# Patient Record
Sex: FEMALE | Race: WHITE | NOT HISPANIC OR LATINO | Employment: OTHER | URBAN - METROPOLITAN AREA
[De-identification: names, ages, dates, MRNs, and addresses within clinical notes are randomized per-mention and may not be internally consistent; named-entity substitution may affect disease eponyms.]

---

## 2023-08-09 LAB
ALANINE AMINOTRANSFERASE (SGPT) (U/L) IN SER/PLAS: 16 U/L (ref 7–45)
ALBUMIN (G/DL) IN SER/PLAS: 4 G/DL (ref 3.4–5)
ALKALINE PHOSPHATASE (U/L) IN SER/PLAS: 62 U/L (ref 33–136)
ANION GAP IN SER/PLAS: 12 MMOL/L (ref 10–20)
ASPARTATE AMINOTRANSFERASE (SGOT) (U/L) IN SER/PLAS: 20 U/L (ref 9–39)
BILIRUBIN TOTAL (MG/DL) IN SER/PLAS: 0.5 MG/DL (ref 0–1.2)
C REACTIVE PROTEIN (MG/L) IN SER/PLAS: 0.1 MG/DL
CALCIUM (MG/DL) IN SER/PLAS: 9.4 MG/DL (ref 8.6–10.3)
CARBON DIOXIDE, TOTAL (MMOL/L) IN SER/PLAS: 27 MMOL/L (ref 21–32)
CHLORIDE (MMOL/L) IN SER/PLAS: 101 MMOL/L (ref 98–107)
CREATININE (MG/DL) IN SER/PLAS: 0.93 MG/DL (ref 0.5–1.05)
ERYTHROCYTE DISTRIBUTION WIDTH (RATIO) BY AUTOMATED COUNT: 13.3 % (ref 11.5–14.5)
ERYTHROCYTE MEAN CORPUSCULAR HEMOGLOBIN CONCENTRATION (G/DL) BY AUTOMATED: 32.6 G/DL (ref 32–36)
ERYTHROCYTE MEAN CORPUSCULAR VOLUME (FL) BY AUTOMATED COUNT: 96 FL (ref 80–100)
ERYTHROCYTES (10*6/UL) IN BLOOD BY AUTOMATED COUNT: 3.91 X10E12/L (ref 4–5.2)
GFR FEMALE: 67 ML/MIN/1.73M2
GLUCOSE (MG/DL) IN SER/PLAS: 86 MG/DL (ref 74–99)
HEMATOCRIT (%) IN BLOOD BY AUTOMATED COUNT: 37.4 % (ref 36–46)
HEMOGLOBIN (G/DL) IN BLOOD: 12.2 G/DL (ref 12–16)
LEUKOCYTES (10*3/UL) IN BLOOD BY AUTOMATED COUNT: 5.5 X10E9/L (ref 4.4–11.3)
PLATELET CLUMP (PRESENCE) IN BLOOD BY LIGHT MICROSCOPY: PRESENT
PLATELETS (10*3/UL) IN BLOOD AUTOMATED COUNT: 104 X10E9/L (ref 150–450)
POTASSIUM (MMOL/L) IN SER/PLAS: 4.3 MMOL/L (ref 3.5–5.3)
PROTEIN TOTAL: 7 G/DL (ref 6.4–8.2)
RBC MORPHOLOGY IN BLOOD: NORMAL
SEDIMENTATION RATE, ERYTHROCYTE: 15 MM/H (ref 0–30)
SODIUM (MMOL/L) IN SER/PLAS: 136 MMOL/L (ref 136–145)
UREA NITROGEN (MG/DL) IN SER/PLAS: 17 MG/DL (ref 6–23)

## 2023-11-21 PROBLEM — M45.9 ANKYLOSING SPONDYLITIS (MULTI): Status: ACTIVE | Noted: 2023-11-21

## 2023-11-21 PROBLEM — A15.9 TUBERCULOSIS, TREATED: Status: ACTIVE | Noted: 2023-11-21

## 2023-11-21 PROBLEM — M54.16 LUMBAR RADICULITIS: Status: ACTIVE | Noted: 2023-11-21

## 2023-11-21 PROBLEM — M25.562 KNEE PAIN, LEFT: Status: ACTIVE | Noted: 2023-11-21

## 2023-11-21 PROBLEM — R39.9 URINARY SYMPTOM OR SIGN: Status: ACTIVE | Noted: 2023-11-21

## 2023-11-21 PROBLEM — I10 BENIGN ESSENTIAL HYPERTENSION: Status: ACTIVE | Noted: 2023-11-21

## 2023-11-21 PROBLEM — J01.90 ACUTE SINUSITIS: Status: ACTIVE | Noted: 2023-11-21

## 2023-11-21 PROBLEM — R00.2 INTERMITTENT PALPITATIONS: Status: ACTIVE | Noted: 2023-11-21

## 2023-11-21 PROBLEM — R53.83 FATIGUE: Status: ACTIVE | Noted: 2023-11-21

## 2023-11-21 PROBLEM — Z85.828 HISTORY OF SKIN CANCER: Status: ACTIVE | Noted: 2023-11-21

## 2023-11-21 PROBLEM — E78.5 HYPERLIPIDEMIA: Status: ACTIVE | Noted: 2023-11-21

## 2023-11-21 PROBLEM — M54.2 CERVICAL PAIN: Status: ACTIVE | Noted: 2023-11-21

## 2023-11-21 PROBLEM — R60.9 DEPENDENT EDEMA: Status: ACTIVE | Noted: 2023-11-21

## 2023-11-21 PROBLEM — M96.1 LUMBAR POST-LAMINECTOMY SYNDROME: Status: ACTIVE | Noted: 2023-11-21

## 2023-11-21 PROBLEM — M54.16 LUMBAR RADICULOPATHY: Status: ACTIVE | Noted: 2023-11-21

## 2023-11-21 RX ORDER — ATENOLOL 25 MG/1
1 TABLET ORAL 2 TIMES DAILY
COMMUNITY
Start: 2019-01-16

## 2023-11-21 RX ORDER — BUTALB/ACETAMINOPHEN/CAFFEINE 50-325-40
TABLET ORAL
COMMUNITY
Start: 2021-02-02

## 2023-11-21 RX ORDER — LISINOPRIL 10 MG/1
1 TABLET ORAL DAILY
COMMUNITY
Start: 2021-02-02

## 2023-11-21 RX ORDER — GABAPENTIN 100 MG/1
100 CAPSULE ORAL 2 TIMES DAILY
COMMUNITY
Start: 2016-06-28 | End: 2023-11-27 | Stop reason: SDUPTHER

## 2023-11-21 RX ORDER — TRAMADOL HYDROCHLORIDE 50 MG/1
1 TABLET ORAL 4 TIMES DAILY PRN
COMMUNITY
Start: 2022-05-23 | End: 2023-11-27 | Stop reason: SDUPTHER

## 2023-11-21 RX ORDER — ATORVASTATIN CALCIUM 40 MG/1
1 TABLET, FILM COATED ORAL DAILY
COMMUNITY
Start: 2021-02-02

## 2023-11-21 RX ORDER — NALOXONE HYDROCHLORIDE 4 MG/.1ML
SPRAY NASAL
COMMUNITY
Start: 2022-05-16 | End: 2023-11-27 | Stop reason: SDUPTHER

## 2023-11-27 ENCOUNTER — OFFICE VISIT (OUTPATIENT)
Dept: PAIN MEDICINE | Facility: CLINIC | Age: 68
End: 2023-11-27
Payer: MEDICARE

## 2023-11-27 VITALS — RESPIRATION RATE: 16 BRPM | SYSTOLIC BLOOD PRESSURE: 127 MMHG | HEART RATE: 64 BPM | DIASTOLIC BLOOD PRESSURE: 72 MMHG

## 2023-11-27 DIAGNOSIS — M96.1 LUMBAR POST-LAMINECTOMY SYNDROME: ICD-10-CM

## 2023-11-27 DIAGNOSIS — F11.90 CHRONIC, CONTINUOUS USE OF OPIOIDS: ICD-10-CM

## 2023-11-27 DIAGNOSIS — M54.16 LUMBAR RADICULITIS: ICD-10-CM

## 2023-11-27 DIAGNOSIS — M54.16 LUMBAR RADICULOPATHY: ICD-10-CM

## 2023-11-27 PROCEDURE — 1159F MED LIST DOCD IN RCRD: CPT | Performed by: ANESTHESIOLOGY

## 2023-11-27 PROCEDURE — 3078F DIAST BP <80 MM HG: CPT | Performed by: ANESTHESIOLOGY

## 2023-11-27 PROCEDURE — 1125F AMNT PAIN NOTED PAIN PRSNT: CPT | Performed by: ANESTHESIOLOGY

## 2023-11-27 PROCEDURE — 99203 OFFICE O/P NEW LOW 30 MIN: CPT | Performed by: ANESTHESIOLOGY

## 2023-11-27 PROCEDURE — 3074F SYST BP LT 130 MM HG: CPT | Performed by: ANESTHESIOLOGY

## 2023-11-27 PROCEDURE — 99213 OFFICE O/P EST LOW 20 MIN: CPT | Performed by: ANESTHESIOLOGY

## 2023-11-27 PROCEDURE — 1036F TOBACCO NON-USER: CPT | Performed by: ANESTHESIOLOGY

## 2023-11-27 RX ORDER — NALOXONE HYDROCHLORIDE 4 MG/.1ML
4 SPRAY NASAL AS NEEDED
Qty: 2 EACH | Refills: 0 | Status: SHIPPED | OUTPATIENT
Start: 2023-11-27

## 2023-11-27 RX ORDER — TRAMADOL HYDROCHLORIDE 50 MG/1
50 TABLET ORAL 4 TIMES DAILY PRN
Qty: 112 TABLET | Refills: 5 | Status: SHIPPED | OUTPATIENT
Start: 2023-11-27 | End: 2024-05-16 | Stop reason: SDUPTHER

## 2023-11-27 RX ORDER — GABAPENTIN 100 MG/1
100 CAPSULE ORAL 2 TIMES DAILY
Qty: 60 CAPSULE | Refills: 11 | Status: SHIPPED | OUTPATIENT
Start: 2023-11-27

## 2023-11-27 ASSESSMENT — ENCOUNTER SYMPTOMS
GASTROINTESTINAL NEGATIVE: 1
BACK PAIN: 1
CONSTITUTIONAL NEGATIVE: 1
DEPRESSION: 0
NUMBNESS: 1
CARDIOVASCULAR NEGATIVE: 1
LOSS OF SENSATION IN FEET: 0
OCCASIONAL FEELINGS OF UNSTEADINESS: 0
RESPIRATORY NEGATIVE: 1

## 2023-11-27 ASSESSMENT — PAIN SCALES - GENERAL
PAINLEVEL_OUTOF10: 4
PAINLEVEL: 4

## 2023-11-27 ASSESSMENT — PAIN DESCRIPTION - DESCRIPTORS: DESCRIPTORS: ACHING;SHARP

## 2023-11-27 ASSESSMENT — PAIN - FUNCTIONAL ASSESSMENT: PAIN_FUNCTIONAL_ASSESSMENT: 0-10
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
## 2024-01-17 DIAGNOSIS — M45.0 ANKYLOSING SPONDYLITIS OF MULTIPLE SITES IN SPINE (MULTI): Primary | ICD-10-CM

## 2024-01-17 RX ORDER — SECUKINUMAB 150 MG/ML
150 INJECTION SUBCUTANEOUS
Qty: 1 ML | Refills: 5 | Status: SHIPPED | OUTPATIENT
Start: 2024-01-17 | End: 2024-07-15

## 2024-02-06 ENCOUNTER — LAB (OUTPATIENT)
Dept: LAB | Facility: LAB | Age: 69
End: 2024-02-06
Payer: MEDICARE

## 2024-02-06 ENCOUNTER — OFFICE VISIT (OUTPATIENT)
Dept: RHEUMATOLOGY | Facility: CLINIC | Age: 69
End: 2024-02-06
Payer: MEDICARE

## 2024-02-06 VITALS
HEIGHT: 61 IN | HEART RATE: 49 BPM | BODY MASS INDEX: 25.11 KG/M2 | WEIGHT: 133 LBS | DIASTOLIC BLOOD PRESSURE: 72 MMHG | SYSTOLIC BLOOD PRESSURE: 130 MMHG

## 2024-02-06 DIAGNOSIS — R53.83 OTHER FATIGUE: Primary | ICD-10-CM

## 2024-02-06 DIAGNOSIS — E55.9 HYPOVITAMINOSIS D: ICD-10-CM

## 2024-02-06 DIAGNOSIS — Z79.899 ENCOUNTER FOR LONG-TERM (CURRENT) USE OF HIGH-RISK MEDICATION: ICD-10-CM

## 2024-02-06 DIAGNOSIS — M45.0 ANKYLOSING SPONDYLITIS OF MULTIPLE SITES IN SPINE (MULTI): ICD-10-CM

## 2024-02-06 DIAGNOSIS — R53.83 OTHER FATIGUE: ICD-10-CM

## 2024-02-06 LAB
ALBUMIN SERPL BCP-MCNC: 4.2 G/DL (ref 3.4–5)
ALP SERPL-CCNC: 65 U/L (ref 33–136)
ALT SERPL W P-5'-P-CCNC: 16 U/L (ref 7–45)
ANION GAP SERPL CALC-SCNC: 11 MMOL/L (ref 10–20)
AST SERPL W P-5'-P-CCNC: 21 U/L (ref 9–39)
BILIRUB SERPL-MCNC: 0.3 MG/DL (ref 0–1.2)
BUN SERPL-MCNC: 15 MG/DL (ref 6–23)
CALCIUM SERPL-MCNC: 9.6 MG/DL (ref 8.6–10.3)
CHLORIDE SERPL-SCNC: 101 MMOL/L (ref 98–107)
CO2 SERPL-SCNC: 28 MMOL/L (ref 21–32)
CREAT SERPL-MCNC: 0.93 MG/DL (ref 0.5–1.05)
CRP SERPL-MCNC: 0.21 MG/DL
EGFRCR SERPLBLD CKD-EPI 2021: 67 ML/MIN/1.73M*2
ERYTHROCYTE [DISTWIDTH] IN BLOOD BY AUTOMATED COUNT: 13.1 % (ref 11.5–14.5)
ERYTHROCYTE [SEDIMENTATION RATE] IN BLOOD BY WESTERGREN METHOD: 32 MM/H (ref 0–30)
GLUCOSE SERPL-MCNC: 83 MG/DL (ref 74–99)
HCT VFR BLD AUTO: 40.7 % (ref 36–46)
HGB BLD-MCNC: 13.1 G/DL (ref 12–16)
MCH RBC QN AUTO: 30.8 PG (ref 26–34)
MCHC RBC AUTO-ENTMCNC: 32.2 G/DL (ref 32–36)
MCV RBC AUTO: 96 FL (ref 80–100)
NRBC BLD-RTO: 0 /100 WBCS (ref 0–0)
PLATELET # BLD AUTO: 230 X10*3/UL (ref 150–450)
POTASSIUM SERPL-SCNC: 4 MMOL/L (ref 3.5–5.3)
PROT SERPL-MCNC: 7.7 G/DL (ref 6.4–8.2)
RBC # BLD AUTO: 4.26 X10*6/UL (ref 4–5.2)
SODIUM SERPL-SCNC: 136 MMOL/L (ref 136–145)
TSH SERPL-ACNC: 2.29 MIU/L (ref 0.44–3.98)
WBC # BLD AUTO: 7.1 X10*3/UL (ref 4.4–11.3)

## 2024-02-06 PROCEDURE — 86140 C-REACTIVE PROTEIN: CPT

## 2024-02-06 PROCEDURE — 80053 COMPREHEN METABOLIC PANEL: CPT

## 2024-02-06 PROCEDURE — 3075F SYST BP GE 130 - 139MM HG: CPT | Performed by: INTERNAL MEDICINE

## 2024-02-06 PROCEDURE — 1036F TOBACCO NON-USER: CPT | Performed by: INTERNAL MEDICINE

## 2024-02-06 PROCEDURE — 82607 VITAMIN B-12: CPT

## 2024-02-06 PROCEDURE — 82306 VITAMIN D 25 HYDROXY: CPT

## 2024-02-06 PROCEDURE — 85652 RBC SED RATE AUTOMATED: CPT

## 2024-02-06 PROCEDURE — 1125F AMNT PAIN NOTED PAIN PRSNT: CPT | Performed by: INTERNAL MEDICINE

## 2024-02-06 PROCEDURE — 3078F DIAST BP <80 MM HG: CPT | Performed by: INTERNAL MEDICINE

## 2024-02-06 PROCEDURE — 99214 OFFICE O/P EST MOD 30 MIN: CPT | Performed by: INTERNAL MEDICINE

## 2024-02-06 PROCEDURE — 86481 TB AG RESPONSE T-CELL SUSP: CPT

## 2024-02-06 PROCEDURE — 84443 ASSAY THYROID STIM HORMONE: CPT

## 2024-02-06 PROCEDURE — 1159F MED LIST DOCD IN RCRD: CPT | Performed by: INTERNAL MEDICINE

## 2024-02-06 PROCEDURE — 85027 COMPLETE CBC AUTOMATED: CPT

## 2024-02-06 PROCEDURE — 36415 COLL VENOUS BLD VENIPUNCTURE: CPT

## 2024-02-06 ASSESSMENT — ENCOUNTER SYMPTOMS
SHORTNESS OF BREATH: 0
FATIGUE: 1

## 2024-02-06 NOTE — PROGRESS NOTES
Subjective   Patient ID: Mariah Hurd is a 68 y.o. female who presents for Follow-up.  HPI  Patient with history of ankylosing spondylitis with history of fusion of the lumbar spine.  Previously has been on Enbrel, Humira, Cimzia.  Status post tuberculosis treatment.    At her last visit on 8/8/2023 we had her continue with Cosentyx.    She did have a fall when she was walking into the grocery store.  She usually walks in with a cane.  Someone had to pick her up.  She had lost her balance.  She had just finished up physical therapy and her balance has improved especially when she is in the bathroom.  She can close her eyes and not fall over.  She has had some decrease in her spine pain.    In the last 6 months though she has been having more pain in her upper back.    She still continues to be with pain management and is on tramadol and gabapentin.    She feels very fatigued.  Her endurance is decreased.  She is part of a singing group and has no issues singing.  She has been sleeping about 50% of the time.  When the weather is bad she does not want to go out because she is afraid she might fall.  Review of Systems   Constitutional:  Positive for fatigue.   Eyes:         No inflammatory eye issues   Respiratory:  Negative for shortness of breath.    Cardiovascular:  Negative for chest pain.   Gastrointestinal:         Colonoscopy last year saw tubular adenoma   Musculoskeletal:         Per HPI   Skin:  Negative for rash.       Objective   Physical Exam  GEN: NAD A&O x3 appropriate affect limping cane.   LYMPH: no cervical lymphadenopathy  SKIN: no rashes  PULSES: +radials  TENDER POINTS: 04/18   MSK:  hypertrophic changes in the dip pip  No tenderness on palpation  No tenderness in the cervical spine, tender lower spine  Right knee replacement      Assessment/Plan     ankylosing spondylitis s/p fusion lumbar spine.  Previously has been on Enbrel, Humira, Cimzia.  Previous tuberculosis treatment.    -Will have  her continue with Cosentyx   -She did have a fall since we last saw her and has just completed physical therapy.  Discussed about using a rollator instead of a cane     Increasing fatigue.  She says she has been sleeping 50% of the time.  She does not want to leave the house especially when it is cold.  She is afraid to go alone because she might fall like she did in the parking lot.  She claims that her mood has been doing okay.  Will do blood work to see for other reasons of fatigue.    Previously has been on enbrel and Humira. History of tuberculosis treatment. Patient felt Cimzia contributed to her depression. Currently on Cosentyx. Her current regimen. Will have her do blood work today. Discussed I am not concerned with her being on with her history of tubular adenoma     Last bone density from June 19, 2023 ordered by another provider showed osteopenia    Will see her again in 6 months or as needed       Kristy Lamb MD 02/06/24 11:54 AM

## 2024-02-07 LAB
25(OH)D3 SERPL-MCNC: 42 NG/ML (ref 30–100)
VIT B12 SERPL-MCNC: 554 PG/ML (ref 211–911)

## 2024-02-08 LAB
NIL(NEG) CONTROL SPOT COUNT: NORMAL
PANEL A SPOT COUNT: 0
PANEL B SPOT COUNT: 0
POS CONTROL SPOT COUNT: NORMAL
T-SPOT. TB INTERPRETATION: NEGATIVE

## 2024-04-02 ENCOUNTER — TELEPHONE (OUTPATIENT)
Dept: OBSTETRICS AND GYNECOLOGY | Facility: CLINIC | Age: 69
End: 2024-04-02
Payer: MEDICARE

## 2024-04-02 DIAGNOSIS — Z12.31 VISIT FOR SCREENING MAMMOGRAM: Primary | ICD-10-CM

## 2024-04-08 ENCOUNTER — APPOINTMENT (OUTPATIENT)
Dept: RADIOLOGY | Facility: HOSPITAL | Age: 69
End: 2024-04-08
Payer: MEDICARE

## 2024-05-16 ENCOUNTER — OFFICE VISIT (OUTPATIENT)
Dept: PAIN MEDICINE | Facility: CLINIC | Age: 69
End: 2024-05-16
Payer: MEDICARE

## 2024-05-16 VITALS
HEART RATE: 73 BPM | DIASTOLIC BLOOD PRESSURE: 71 MMHG | RESPIRATION RATE: 16 BRPM | OXYGEN SATURATION: 100 % | SYSTOLIC BLOOD PRESSURE: 145 MMHG

## 2024-05-16 DIAGNOSIS — M45.0 ANKYLOSING SPONDYLITIS OF MULTIPLE SITES IN SPINE (MULTI): ICD-10-CM

## 2024-05-16 DIAGNOSIS — M96.1 LUMBAR POST-LAMINECTOMY SYNDROME: Primary | ICD-10-CM

## 2024-05-16 DIAGNOSIS — M54.16 LUMBAR RADICULITIS: ICD-10-CM

## 2024-05-16 PROCEDURE — 99213 OFFICE O/P EST LOW 20 MIN: CPT | Performed by: NURSE PRACTITIONER

## 2024-05-16 PROCEDURE — 1160F RVW MEDS BY RX/DR IN RCRD: CPT | Performed by: NURSE PRACTITIONER

## 2024-05-16 PROCEDURE — 1036F TOBACCO NON-USER: CPT | Performed by: NURSE PRACTITIONER

## 2024-05-16 PROCEDURE — 3077F SYST BP >= 140 MM HG: CPT | Performed by: NURSE PRACTITIONER

## 2024-05-16 PROCEDURE — 3078F DIAST BP <80 MM HG: CPT | Performed by: NURSE PRACTITIONER

## 2024-05-16 PROCEDURE — 1125F AMNT PAIN NOTED PAIN PRSNT: CPT | Performed by: NURSE PRACTITIONER

## 2024-05-16 PROCEDURE — 1159F MED LIST DOCD IN RCRD: CPT | Performed by: NURSE PRACTITIONER

## 2024-05-16 RX ORDER — TRAMADOL HYDROCHLORIDE 50 MG/1
50 TABLET ORAL 4 TIMES DAILY PRN
Qty: 112 TABLET | Refills: 2 | Status: SHIPPED | OUTPATIENT
Start: 2024-05-16

## 2024-05-16 ASSESSMENT — ENCOUNTER SYMPTOMS
CARDIOVASCULAR NEGATIVE: 1
PSYCHIATRIC NEGATIVE: 1
EYES NEGATIVE: 1
JOINT SWELLING: 0
MYALGIAS: 1
NECK PAIN: 0
GASTROINTESTINAL NEGATIVE: 1
NUMBNESS: 1
HEADACHES: 0
ENDOCRINE NEGATIVE: 1
WEAKNESS: 1
ARTHRALGIAS: 1
SEIZURES: 0
DIZZINESS: 0
NECK STIFFNESS: 0
FACIAL ASYMMETRY: 0
RESPIRATORY NEGATIVE: 1
SPEECH DIFFICULTY: 0
LIGHT-HEADEDNESS: 0
BACK PAIN: 1
TREMORS: 0
ALLERGIC/IMMUNOLOGIC NEGATIVE: 1
HEMATOLOGIC/LYMPHATIC NEGATIVE: 1
CONSTITUTIONAL NEGATIVE: 1

## 2024-05-16 ASSESSMENT — PAIN SCALES - GENERAL: PAINLEVEL: 4

## 2024-05-16 NOTE — PROGRESS NOTES
Subjective   Patient ID: Mariah Hurd is a 68 y.o. female who presents for Med Refill and Pain (Low Back, Legs ).  TIAN Love follows up for interval reevaluation of her chronic back pain from lumbar post laminectomy syndrome, left leg radiculitis.      Tramadol 50 mg 4 to 5 times daily and gabapentin 100 mg 2 capsules at night help to reduce pain and improve function between 70 and 80%. On a good day average pain score is 3 out of 10.  Denies negative side effects of medications.    Continues to manage ADLs  independently with improved function with the use of medication. Has difficulty with weightbearing activities as leg pain and weakness are both in a L4 and L5 dermatomal distribution to the anterior thigh and knee.      Has a below average quality family and social of current condition and treatment. Has not been to the ED for pain since last office visit. She does continue to go to chiropractor and daily exercise with physical therapy home exercises to help with back and leg pain and right leg weakness.      Toxicology consistent November 27, 2023.  Annual controlled substance agreement and opioid risk tool are completed and scanned into the chart May 16, 2024.    Noted on pharmacy report reviewed today May 16, 2024 gabapentin for her dog.     Her low back pain is chronic pain that is stiff and achy in nature can also be sharp and stabbing greater on right than the left. Her low back pain is from lumbar post laminectomy syndrome and ankylosing spondylitis. She is also chronic right lower extremity weakness which has not changed. She does utilize a cane for ambulation for this weak leg. No falls since last office visit.     Bilateral transforaminal L4 and L5 lumbar epidural steroid injection November 4, 2021 only gave 10% relief of pain with improved function for up to 3 days. Unfortunately, steroid injection was not as helpful as we had hoped.     We will hold off on any further injection therapy. Can  pursue PT if she would like.  Last formal physical therapy was about 1 year ago.     Continues to have right leg pain after laying in bed 2 to 3 hours and then is up and walks about to help reduce the leg pain. Driving greater than 15-minute distances can also increase right leg pain.     For continuity:   Given the patient's report of reduced pain and improved functional ability without adverse effects, it is reasonable to treat with narcotic medications. The terms of the opioid agreement as well as the potential risks and adverse effects of the patient's medication regimen were discussed in detail. This includes if applicable due to dosage of medication permission to discuss and coordinate care with other treatment providers relevant to the patients condition. The patient verbalized understanding.     Risks and side effects of chronic opioid therapy including but not limited to tolerance, dependence, constipation, hyperalgesia, cognitive side effects, addiction and possible death due to overuse and or misuse were discussed. I also discussed that such medications when co-administered with other sedative agents including but not limited to alcohol, benzodiazepines, sedative hypnotics and illegal drugs could pose life threatening consequences including death. I also explained the impact that the administration of such medication has on a patient with obstructive sleep apnea and continued recommendations for use of apnea devices if ordered are prescribed by other physicians. In order to effectively and safely treat the pain, I also emphasized the importance of compliance with the treatment plan, as well as compliance with the terms of the opioid agreement, which was reviewed in detail. I explained the importance of being responsible with the medications and to take these only as prescribed, never in excess and never for reasons other than pain reduction. The patient was counseled on keeping the medications safe and  locked away from children and other adults as well as disposal methods and options. The patient understood the risks and instructions.     I also discussed with the patient in detail that based on the clinical response to the opioid medications and improvements of activities of daily living, sleep, and work performance in light of compliance with the treatment plan we can continue this form of therapy for the above chronic pain. The goal and rationale used for current treatment with chronic opioid medication is to control the pain and alleviate disability induced by the chronic pain condition noted above after failures of other non-opioid and nonpharmacological modalities to treat the chronic pain and the symptoms associated with have failed. The patient understood the goals in terms of the above treatment plan and had no further questions prior to leaving the office today.     Of note, the above-mentioned diagnoses/conditions and expected fluctuating nature of pain, and pain characteristic changes may lead to prolonged functional impairment requiring frequent and multiple reassessments with continued high level medical decision making. As noted, medication and medication management may require opiate therapy in excess of a routine less than 30 day medication requirement. The patient may require daily opiate therapy necessitating month-long prescription medication as noted above in order to perform activities of daily living and achieve acceptable quality of life with respect to their chronic pain condition for the foreseeable future. We monitor our patient's carefully through drug monitoring, medication counts, urine drug testing specific to their medication as well as a myriad of other substances and with frequent follow-ups with interval reassement of the chronic pain condition, its pathophysiology and prognosis.     The level of clinical decision making at this office visit is high due to high risks and  complications including mortality and morbidity related to acute and chronic pain with respects to life, bodily function, and treatment. Risks and clinical decisions with respect to under treatment, failure to maintain adequate treatment, and/or overtreatment complications and outcomes were discussed with the patient with respect to their chronic pain conditions, interventional therapies, as well as the use of various medications including possible controlled/dangerous medications. The amount and complexity of reviewed data at this in subsequent office visits is high given patient's fluctuating clinical presentation, laboratory and radiographic reports, prescription monitoring program data, and medication history as well as other relevant data as noted above. Pertinent negatives and positives data was used in consideration for the above-mentioned high complexity.      Given the patient's total MED, general use of daily opiates, or other coadministered medications in various classes the patient was offered a prescription for Narcan. I instructed the patient that it is important that patient fill this medication in order to demonstrate understanding of the gravity of possible side effects including respiratory depression and risk of overdose of this opiate load or medication combination. As such patient will be required to bring Narcan prescription to follow-up appointments as part of compliance with continued opiate care.     With respect to opiate induced constipation I discussed multiple ways to combat this problem including staying hydrated and taking over-the-counter medications such as Dulcolax, Miralax and Senna. If these treatments are not effective we could consider such medications as Amitiza, Linzess and Movantik.     Disclaimer: This note was transcribed using an audio transcription device. As such, minor errors may be present with regard to spelling, punctuation, and inadvertent word insertion. Please  disregard such errors.    Narrative & Impression   MRN: [id number]  Patient Name: MARIAN BURTON     STUDY:  SHOULDER, CMPLT, MIN 2 VIEWS;  11/4/2022 8:44 am     INDICATION:  right shoulder pain  M25.511: Right shoulder pain.     COMPARISON:  11/04/2022     ACCESSION NUMBER(S):  [id number]     ORDERING CLINICIAN:  PEDRO MCMAHON     FINDINGS:  Four views right shoulder.     Mild to moderate right glenohumeral osteoarthrosis and mild  acromioclavicular osteoarthrosis. No fracture or dislocation. No  osseous lesion. Soft tissues intact     IMPRESSION:  Mild to moderate right glenohumeral and acromioclavicular  osteoarthrosis. Fracture or dislocation.        Narrative & Impression   MRN: [id number]  Patient Name: MARIAN BURTON     STUDY:  MR L-SPINE WO/W CONTRAST; ;  10/6/2021 10:32 am     INDICATION:  lumbar region.     COMPARISON:  12/27/2018     ACCESSION NUMBER(S):  [id number]     ORDERING CLINICIAN:  CIPRIANO ALICEA     TECHNIQUE:  Multiplanar, multisequence imaging of the lumbar spine was performed  before and after intravenous contrast administration. 12 mL Dotarem  was injected.     FINDINGS:  Conus medullaris terminates at the level of L1 vertebral body.  Visualized portions of the conus and spinal cord shows normal signal  intensity and caliber. Grade 1 anterolisthesis of L5 on S1, measures  up to 8.9 mm, appears slightly decreased from previous study, was  previously measuring up to 10 mm. 2.9 mm retrolisthesis of T12 on L1  shows minimal interval change from prior.     Again note is made of the laminectomy changes at L5-S1 with a  intervening disc grafts at L4-L5, L5-S1 and a left-sided  transpedicular screws at L4, L5 and S1. Again note is made of the  postsurgical changes with laminectomy/posterior clips at L2, causing  streak artifact and limiting evaluation.     On contrast administration there is no pathological enhancement.  Multiple areas of fatty rests in the thoracic and lumbar, sacral  vertebral bodies are  again noted. Mild chronic decrease in the height  of T11 vertebral body/superior endplate is again noted.     At T11-T12 right paracentral disc bulge noted causing indentation of  the anterior thecal sac, minimally progressed from prior. No mass  effect on the cord.     At T12-L1 mild disc osteophyte noted indenting the anterior thecal  sac, mild facet joint arthropathy indenting the posterolateral aspect  of the thecal sac. No significant central spinal canal or neural  foramina stenosis.     At L1-L2 susceptibility artifact from the metallic clips of the L2  vertebral body limit evaluation, no obvious disc bulge, spinal canal  or neural foramina stenosis noted.     At L2-L3 mild facet joint arthropathy noted, postsurgical changes  with a metallic clip on the posterior elements cause susceptibility  artifact and limits evaluation, no obvious central spinal canal or  neural foramina stenosis noted.     At L3-L4 mild facet joint arthropathy noted. No central spinal canal  or neural foramina stenosis.     At L4-L5 mild disc osteophyte noted, moderate facet joint arthropathy  in combination noted causing mild encroachment of the spinal canal  and neural foramina, shows no significant change from prior.     At L5-S1 grade 1 anterolisthesis of L5 on S1, postsurgical changes as  described above, no central spinal canal or right neural foramina  narrowing, mild left neural foramina narrowing, shows minimal  interval change from prior.     IMPRESSION:  Degenerative and postoperative changes as described above, no  significant central spinal canal or neural foramina stenosis. No  significant change from prior.          Review of Systems   Constitutional: Negative.    HENT: Negative.     Eyes: Negative.    Respiratory: Negative.     Cardiovascular: Negative.    Gastrointestinal: Negative.    Endocrine: Negative.    Genitourinary: Negative.    Musculoskeletal:  Positive for arthralgias, back pain, gait problem and myalgias.  Negative for joint swelling, neck pain and neck stiffness.   Skin: Negative.    Allergic/Immunologic: Negative.    Neurological:  Positive for weakness and numbness. Negative for dizziness, tremors, seizures, syncope, facial asymmetry, speech difficulty, light-headedness and headaches.   Hematological: Negative.    Psychiatric/Behavioral: Negative.         Objective   Physical Exam  Vitals and nursing note reviewed.   Constitutional:       Appearance: Normal appearance.   Eyes:      Conjunctiva/sclera: Conjunctivae normal.   Cardiovascular:      Pulses: Normal pulses.   Pulmonary:      Effort: Pulmonary effort is normal. No respiratory distress.   Musculoskeletal:      Right lower leg: No edema.      Left lower leg: No edema.      Comments: Posture stooped.  Ambulates with right-sided cane and antalgic gait.   Skin:     General: Skin is warm and dry.      Capillary Refill: Capillary refill takes 2 to 3 seconds.   Neurological:      Mental Status: She is alert and oriented to person, place, and time.      Cranial Nerves: No cranial nerve deficit.      Sensory: Sensory deficit present.      Motor: Weakness present.      Gait: Gait abnormal.      Comments: Right lower extremity with resisted hip flexion, knee extension and ankle dorsiflexion 3 out of 5 compared to left.  Allodynia to light touch to right and left lower extremities in L4 and L5 dermatomal distribution.  Negative straight leg raise.   Psychiatric:         Mood and Affect: Mood normal.         Behavior: Behavior normal.         Assessment/Plan   Problem List Items Addressed This Visit             ICD-10-CM    Lumbar radiculitis M54.16    Relevant Medications    traMADol (Ultram) 50 mg tablet    Lumbar post-laminectomy syndrome - Primary M96.1    Relevant Medications    traMADol (Ultram) 50 mg tablet    Ankylosing spondylitis (Multi) M45.9        Follow-up 6 months.    EMMANUEL Aleman-CNP 05/16/24 9:00 AM

## 2024-05-17 ENCOUNTER — APPOINTMENT (OUTPATIENT)
Dept: PAIN MEDICINE | Facility: CLINIC | Age: 69
End: 2024-05-17
Payer: MEDICARE

## 2024-05-28 ENCOUNTER — APPOINTMENT (OUTPATIENT)
Dept: PAIN MEDICINE | Facility: CLINIC | Age: 69
End: 2024-05-28
Payer: MEDICARE

## 2024-06-21 ENCOUNTER — HOSPITAL ENCOUNTER (OUTPATIENT)
Dept: RADIOLOGY | Facility: HOSPITAL | Age: 69
Discharge: HOME | End: 2024-06-21
Payer: MEDICARE

## 2024-06-21 VITALS — HEIGHT: 62 IN | WEIGHT: 132 LBS | BODY MASS INDEX: 24.29 KG/M2

## 2024-06-21 DIAGNOSIS — Z12.31 VISIT FOR SCREENING MAMMOGRAM: ICD-10-CM

## 2024-06-21 PROCEDURE — 77067 SCR MAMMO BI INCL CAD: CPT

## 2024-07-11 DIAGNOSIS — M96.1 LUMBAR POST-LAMINECTOMY SYNDROME: ICD-10-CM

## 2024-07-11 DIAGNOSIS — M54.16 LUMBAR RADICULITIS: ICD-10-CM

## 2024-07-11 RX ORDER — TRAMADOL HYDROCHLORIDE 50 MG/1
50 TABLET ORAL 4 TIMES DAILY PRN
Qty: 112 TABLET | Refills: 2 | Status: SHIPPED | OUTPATIENT
Start: 2024-07-11

## 2024-08-06 ENCOUNTER — LAB (OUTPATIENT)
Dept: LAB | Facility: LAB | Age: 69
End: 2024-08-06
Payer: MEDICARE

## 2024-08-06 ENCOUNTER — APPOINTMENT (OUTPATIENT)
Dept: RHEUMATOLOGY | Facility: CLINIC | Age: 69
End: 2024-08-06
Payer: MEDICARE

## 2024-08-06 VITALS — SYSTOLIC BLOOD PRESSURE: 118 MMHG | DIASTOLIC BLOOD PRESSURE: 68 MMHG | OXYGEN SATURATION: 100 % | HEART RATE: 71 BPM

## 2024-08-06 DIAGNOSIS — M45.0 ANKYLOSING SPONDYLITIS OF MULTIPLE SITES IN SPINE (MULTI): Primary | ICD-10-CM

## 2024-08-06 DIAGNOSIS — Z79.899 ENCOUNTER FOR LONG-TERM (CURRENT) USE OF HIGH-RISK MEDICATION: ICD-10-CM

## 2024-08-06 DIAGNOSIS — M45.0 ANKYLOSING SPONDYLITIS OF MULTIPLE SITES IN SPINE (MULTI): ICD-10-CM

## 2024-08-06 LAB
ALBUMIN SERPL BCP-MCNC: 4.1 G/DL (ref 3.4–5)
ALP SERPL-CCNC: 62 U/L (ref 33–136)
ALT SERPL W P-5'-P-CCNC: 17 U/L (ref 7–45)
ANION GAP SERPL CALC-SCNC: 14 MMOL/L (ref 10–20)
AST SERPL W P-5'-P-CCNC: 22 U/L (ref 9–39)
BILIRUB SERPL-MCNC: 0.5 MG/DL (ref 0–1.2)
BUN SERPL-MCNC: 21 MG/DL (ref 6–23)
CALCIUM SERPL-MCNC: 9.7 MG/DL (ref 8.6–10.3)
CHLORIDE SERPL-SCNC: 100 MMOL/L (ref 98–107)
CO2 SERPL-SCNC: 26 MMOL/L (ref 21–32)
CREAT SERPL-MCNC: 0.91 MG/DL (ref 0.5–1.05)
CRP SERPL-MCNC: <0.1 MG/DL
EGFRCR SERPLBLD CKD-EPI 2021: 68 ML/MIN/1.73M*2
ERYTHROCYTE [DISTWIDTH] IN BLOOD BY AUTOMATED COUNT: 13.1 % (ref 11.5–14.5)
ERYTHROCYTE [SEDIMENTATION RATE] IN BLOOD BY WESTERGREN METHOD: 24 MM/H (ref 0–30)
GLUCOSE SERPL-MCNC: 86 MG/DL (ref 74–99)
HCT VFR BLD AUTO: 39.2 % (ref 36–46)
HGB BLD-MCNC: 12.6 G/DL (ref 12–16)
MCH RBC QN AUTO: 31 PG (ref 26–34)
MCHC RBC AUTO-ENTMCNC: 32.1 G/DL (ref 32–36)
MCV RBC AUTO: 97 FL (ref 80–100)
NRBC BLD-RTO: 0 /100 WBCS (ref 0–0)
PLATELET # BLD AUTO: 132 X10*3/UL (ref 150–450)
POTASSIUM SERPL-SCNC: 4.2 MMOL/L (ref 3.5–5.3)
PROT SERPL-MCNC: 7 G/DL (ref 6.4–8.2)
RBC # BLD AUTO: 4.06 X10*6/UL (ref 4–5.2)
SODIUM SERPL-SCNC: 136 MMOL/L (ref 136–145)
WBC # BLD AUTO: 6.5 X10*3/UL (ref 4.4–11.3)

## 2024-08-06 PROCEDURE — 1036F TOBACCO NON-USER: CPT | Performed by: INTERNAL MEDICINE

## 2024-08-06 PROCEDURE — 99213 OFFICE O/P EST LOW 20 MIN: CPT | Performed by: INTERNAL MEDICINE

## 2024-08-06 PROCEDURE — 85027 COMPLETE CBC AUTOMATED: CPT

## 2024-08-06 PROCEDURE — 85652 RBC SED RATE AUTOMATED: CPT

## 2024-08-06 PROCEDURE — 86140 C-REACTIVE PROTEIN: CPT

## 2024-08-06 PROCEDURE — 1159F MED LIST DOCD IN RCRD: CPT | Performed by: INTERNAL MEDICINE

## 2024-08-06 PROCEDURE — 36415 COLL VENOUS BLD VENIPUNCTURE: CPT

## 2024-08-06 PROCEDURE — 1125F AMNT PAIN NOTED PAIN PRSNT: CPT | Performed by: INTERNAL MEDICINE

## 2024-08-06 PROCEDURE — 3078F DIAST BP <80 MM HG: CPT | Performed by: INTERNAL MEDICINE

## 2024-08-06 PROCEDURE — 3074F SYST BP LT 130 MM HG: CPT | Performed by: INTERNAL MEDICINE

## 2024-08-06 PROCEDURE — 80053 COMPREHEN METABOLIC PANEL: CPT

## 2024-08-06 RX ORDER — SECUKINUMAB 150 MG/ML
150 INJECTION SUBCUTANEOUS ONCE
COMMUNITY
End: 2024-08-06 | Stop reason: SDUPTHER

## 2024-08-06 RX ORDER — SECUKINUMAB 150 MG/ML
150 INJECTION SUBCUTANEOUS
Qty: 3 ML | Refills: 3 | Status: SHIPPED | OUTPATIENT
Start: 2024-08-06 | End: 2025-08-06

## 2024-08-06 ASSESSMENT — PATIENT HEALTH QUESTIONNAIRE - PHQ9
1. LITTLE INTEREST OR PLEASURE IN DOING THINGS: NOT AT ALL
2. FEELING DOWN, DEPRESSED OR HOPELESS: NOT AT ALL
SUM OF ALL RESPONSES TO PHQ9 QUESTIONS 1 & 2: 0

## 2024-08-06 ASSESSMENT — ENCOUNTER SYMPTOMS
FATIGUE: 1
DEPRESSION: 0
LOSS OF SENSATION IN FEET: 0
OCCASIONAL FEELINGS OF UNSTEADINESS: 1
SHORTNESS OF BREATH: 0

## 2024-08-06 ASSESSMENT — LIFESTYLE VARIABLES
SKIP TO QUESTIONS 9-10: 1
HOW OFTEN DO YOU HAVE A DRINK CONTAINING ALCOHOL: MONTHLY OR LESS
HOW MANY STANDARD DRINKS CONTAINING ALCOHOL DO YOU HAVE ON A TYPICAL DAY: 1 OR 2
AUDIT-C TOTAL SCORE: 1
HOW OFTEN DO YOU HAVE SIX OR MORE DRINKS ON ONE OCCASION: NEVER

## 2024-08-06 ASSESSMENT — PAIN SCALES - GENERAL: PAINLEVEL: 4

## 2024-08-06 NOTE — TELEPHONE ENCOUNTER
Patient called in requesting a refill on her Cosentyx injection be sent to [facility].     Rx pending to be sent into pharmacy upon your approval.     Please assist and advise.  Thank you   Medina

## 2024-08-06 NOTE — PROGRESS NOTES
Subjective   Patient ID: Mariah Hurd is a 69 y.o. female who presents for Follow-up.  HPI  Patient with history of ankylosing spondylitis with history of fusion of the lumbar spine.  Previously has been on Enbrel, Humira, Cimzia.  Status post tuberculosis treatment.    At her last visit on 8/8/2023 we had her continue with Cosentyx.    She did have a fall when she was walking into the grocery store.  She usually walks in with a cane.  Someone had to pick her up.  She had lost her balance.  She had just finished up physical therapy and her balance has improved especially when she is in the bathroom.  She can close her eyes and not fall over.  She has had some decrease in her spine pain.    In the last 6 months though she has been having more pain in her upper back.    She still continues to be with pain management and is on tramadol and gabapentin.    She feels very fatigued.  Her endurance is decreased.  She is part of a singing group and has no issues singing.  She has been sleeping about 50% of the time.  When the weather is bad she does not want to go out because she is afraid she might fall.  Review of Systems   Constitutional:  Positive for fatigue.   Eyes:         No inflammatory eye issues   Respiratory:  Negative for shortness of breath.    Cardiovascular:  Negative for chest pain.   Gastrointestinal:         Colonoscopy last year saw tubular adenoma   Musculoskeletal:         Per HPI   Skin:  Negative for rash.       Objective   Physical Exam  GEN: NAD A&O x3 appropriate affect limping cane.   LYMPH: no cervical lymphadenopathy  SKIN: no rashes  PULSES: +radials  TENDER POINTS: 04/18   MSK:  hypertrophic changes in the dip pip  No tenderness on palpation  No tenderness in the cervical spine, tender lower spine  Right knee replacement      Assessment/Plan     ankylosing spondylitis s/p fusion lumbar spine.  Previously has been on Enbrel, Humira, Cimzia.  Previous tuberculosis treatment.    -Will have  her continue with Cosentyx   -She did have a fall since we last saw her and has just completed physical therapy.  Discussed about using a rollator instead of a cane     Increasing fatigue.  She says she has been sleeping 50% of the time.  She does not want to leave the house especially when it is cold.  She is afraid to go alone because she might fall like she did in the parking lot.  She claims that her mood has been doing okay.  Will do blood work to see for other reasons of fatigue.    Previously has been on enbrel and Humira. History of tuberculosis treatment. Patient felt Cimzia contributed to her depression. Currently on Cosentyx. Her current regimen. Will have her do blood work today. Discussed I am not concerned with her being on with her history of tubular adenoma     Last bone density from June 19, 2023 ordered by another provider showed osteopenia    Will see her again in 6 months or as needed       Dusty Dan DPM 08/06/24 9:03 AM

## 2024-08-06 NOTE — PROGRESS NOTES
Subjective   Patient ID: Mariah Hurd is a 69 y.o. female who presents for Follow-up.  HPI  Patient with history of ankylosing spondylitis with history of fusion of the lumbar spine.  Previously has been on Enbrel, Humira, Cimzia.  Status post tuberculosis treatment.    We had last seen her in February and had her continue with Cosentyx.    She has been working with a chiropractor for pain in her upper back.  He has been mostly stretching her so far and not doing a lot of manipulation.  Does have curvature there.  Sometimes the pain in her back can get very depressing.    She started using a walker and has helped her balance.  She cannot walk very far with a cane.    She has been walking at a park 1 and half miles and she feels good when she does it in does not with a walker.    She is currently on gabapentin and tramadol through pain management.    Denies any new falls since I last saw her.  Denies any infections.  Review of Systems   Constitutional:  Positive for fatigue.   Eyes:         No inflammatory eye issues   Respiratory:  Negative for shortness of breath.    Cardiovascular:  Negative for chest pain.   Musculoskeletal:         Per HPI   Skin:  Negative for rash.       Objective   Physical Exam  GEN: NAD A&O x3 appropriate affect limping cane.   LYMPH: no cervical lymphadenopathy  SKIN: no rashes  PULSES: +radials  TENDER POINTS: 04/18   MSK:  hypertrophic changes in the dip pip  No tenderness on palpation  No tenderness in the cervical spine, tender lower spine  Right knee replacement      Assessment/Plan     ankylosing spondylitis s/p fusion lumbar spine.  Previously has been on Enbrel, Humira, Cimzia.  Previous tuberculosis treatment.    -Will have her continue with Cosentyx   -will have her do blood work    Last bone density from June 19, 2023 ordered by another provider showed osteopenia    Will see her again in 6 months or as needed       Kristy Lamb MD 08/06/24 9:32 AM

## 2024-08-09 DIAGNOSIS — M45.0 ANKYLOSING SPONDYLITIS OF MULTIPLE SITES IN SPINE (MULTI): ICD-10-CM

## 2024-08-09 DIAGNOSIS — M96.1 LUMBAR POST-LAMINECTOMY SYNDROME: ICD-10-CM

## 2024-08-09 DIAGNOSIS — M54.16 LUMBAR RADICULITIS: ICD-10-CM

## 2024-08-09 RX ORDER — TRAMADOL HYDROCHLORIDE 50 MG/1
50 TABLET ORAL 4 TIMES DAILY PRN
Qty: 112 TABLET | Refills: 1 | Status: SHIPPED | OUTPATIENT
Start: 2024-08-09

## 2024-10-02 ENCOUNTER — OFFICE VISIT (OUTPATIENT)
Dept: PAIN MEDICINE | Facility: CLINIC | Age: 69
End: 2024-10-02
Payer: MEDICARE

## 2024-10-02 VITALS
OXYGEN SATURATION: 100 % | SYSTOLIC BLOOD PRESSURE: 123 MMHG | HEART RATE: 71 BPM | DIASTOLIC BLOOD PRESSURE: 70 MMHG | RESPIRATION RATE: 16 BRPM

## 2024-10-02 DIAGNOSIS — M96.1 LUMBAR POST-LAMINECTOMY SYNDROME: Primary | ICD-10-CM

## 2024-10-02 DIAGNOSIS — M54.16 LUMBAR RADICULITIS: ICD-10-CM

## 2024-10-02 DIAGNOSIS — Z79.891 ENCOUNTER FOR LONG-TERM USE OF OPIATE ANALGESIC: ICD-10-CM

## 2024-10-02 PROCEDURE — 1125F AMNT PAIN NOTED PAIN PRSNT: CPT | Performed by: NURSE PRACTITIONER

## 2024-10-02 PROCEDURE — 99214 OFFICE O/P EST MOD 30 MIN: CPT | Performed by: NURSE PRACTITIONER

## 2024-10-02 PROCEDURE — 1159F MED LIST DOCD IN RCRD: CPT | Performed by: NURSE PRACTITIONER

## 2024-10-02 PROCEDURE — 1036F TOBACCO NON-USER: CPT | Performed by: NURSE PRACTITIONER

## 2024-10-02 PROCEDURE — 3078F DIAST BP <80 MM HG: CPT | Performed by: NURSE PRACTITIONER

## 2024-10-02 PROCEDURE — 1160F RVW MEDS BY RX/DR IN RCRD: CPT | Performed by: NURSE PRACTITIONER

## 2024-10-02 PROCEDURE — 3074F SYST BP LT 130 MM HG: CPT | Performed by: NURSE PRACTITIONER

## 2024-10-02 RX ORDER — TRAMADOL HYDROCHLORIDE 50 MG/1
50 TABLET ORAL 4 TIMES DAILY PRN
Qty: 112 TABLET | Refills: 2 | Status: SHIPPED | OUTPATIENT
Start: 2024-10-03

## 2024-10-02 RX ORDER — NALOXONE HYDROCHLORIDE 4 MG/.1ML
4 SPRAY NASAL AS NEEDED
Qty: 2 EACH | Refills: 0 | Status: SHIPPED | OUTPATIENT
Start: 2024-10-02

## 2024-10-02 ASSESSMENT — ENCOUNTER SYMPTOMS
GASTROINTESTINAL NEGATIVE: 1
ENDOCRINE NEGATIVE: 1
SEIZURES: 0
FACIAL ASYMMETRY: 0
JOINT SWELLING: 0
SPEECH DIFFICULTY: 0
LIGHT-HEADEDNESS: 0
WEAKNESS: 1
EYES NEGATIVE: 1
MYALGIAS: 1
TREMORS: 0
NECK PAIN: 0
HEADACHES: 0
RESPIRATORY NEGATIVE: 1
ALLERGIC/IMMUNOLOGIC NEGATIVE: 1
NUMBNESS: 1
NECK STIFFNESS: 0
CARDIOVASCULAR NEGATIVE: 1
DIZZINESS: 0
HEMATOLOGIC/LYMPHATIC NEGATIVE: 1
ARTHRALGIAS: 1
PAIN: 1
BACK PAIN: 1
CONSTITUTIONAL NEGATIVE: 1
PSYCHIATRIC NEGATIVE: 1

## 2024-10-02 ASSESSMENT — PAIN SCALES - GENERAL: PAINLEVEL: 5

## 2024-10-02 NOTE — PROGRESS NOTES
Scheduled MIBG scan with patient and nuc med   Subjective   Patient ID: Mariah Hurd is a 69 y.o. female who presents for Med Refill and Pain (Low Back/Legs ).  Med Refill  Associated symptoms include arthralgias, myalgias, numbness and weakness. Pertinent negatives include no headaches, joint swelling or neck pain.   Pain  Associated symptoms include weakness. Pertinent negatives include no headaches or joint swelling.       Kate follows up for interval reevaluation of her chronic back pain from lumbar post laminectomy syndrome, left leg radiculitis.      Tramadol 50 mg 4 to 5 times daily and gabapentin 100 mg 2 capsules at night help to reduce pain and improve function between 70 and 80%. On a good day average pain score is 3 out of 10.  Denies negative side effects of medications.    Continues to manage ADLs  independently with improved function with the use of medication. Has difficulty with weightbearing activities as leg pain and weakness are both in a L4 and L5 dermatomal distribution to the anterior thigh and knee.      Has a below average quality family and social of current condition and treatment. Has not been to the ED for pain since last office visit. She does continue to go to chiropractor and daily exercise with physical therapy home exercises to help with back and leg pain and right leg weakness.      Toxicology consistent November 27, 2023.  Toxicology today.  To annual controlled substance agreement and opioid risk tool are completed and scanned into the chart May 16, 2024.    Noted on pharmacy report reviewed today May 16, 2024 gabapentin for her dog.     Her low back pain is chronic pain that is stiff and achy in nature can also be sharp and stabbing greater on right than the left. Her low back pain is from lumbar post laminectomy syndrome and ankylosing spondylitis. She is also chronic right lower extremity weakness which has not changed. She does utilize a cane for ambulation for this weak leg. No falls since last office visit.      History of bilateral transforaminal L4 and L5 lumbar epidural steroid injection November 4, 2021 only gave 10% relief of pain with improved function for up to 3 days. Unfortunately, steroid injection was not as helpful as we had hoped.     We will hold off on any further injection therapy. Can pursue PT if she would like.  Last formal physical therapy was about 2 years ago.     Continues to have right leg pain after laying in bed 2 to 3 hours and then is up and walks about to help reduce the leg pain. Driving greater than 15-minute distances can also increase right leg pain.     For continuity:   Given the patient's report of reduced pain and improved functional ability without adverse effects, it is reasonable to treat with narcotic medications. The terms of the opioid agreement as well as the potential risks and adverse effects of the patient's medication regimen were discussed in detail. This includes if applicable due to dosage of medication permission to discuss and coordinate care with other treatment providers relevant to the patients condition. The patient verbalized understanding.     Risks and side effects of chronic opioid therapy including but not limited to tolerance, dependence, constipation, hyperalgesia, cognitive side effects, addiction and possible death due to overuse and or misuse were discussed. I also discussed that such medications when co-administered with other sedative agents including but not limited to alcohol, benzodiazepines, sedative hypnotics and illegal drugs could pose life threatening consequences including death. I also explained the impact that the administration of such medication has on a patient with obstructive sleep apnea and continued recommendations for use of apnea devices if ordered are prescribed by other physicians. In order to effectively and safely treat the pain, I also emphasized the importance of compliance with the treatment plan, as well as compliance with the  terms of the opioid agreement, which was reviewed in detail. I explained the importance of being responsible with the medications and to take these only as prescribed, never in excess and never for reasons other than pain reduction. The patient was counseled on keeping the medications safe and locked away from children and other adults as well as disposal methods and options. The patient understood the risks and instructions.     I also discussed with the patient in detail that based on the clinical response to the opioid medications and improvements of activities of daily living, sleep, and work performance in light of compliance with the treatment plan we can continue this form of therapy for the above chronic pain. The goal and rationale used for current treatment with chronic opioid medication is to control the pain and alleviate disability induced by the chronic pain condition noted above after failures of other non-opioid and nonpharmacological modalities to treat the chronic pain and the symptoms associated with have failed. The patient understood the goals in terms of the above treatment plan and had no further questions prior to leaving the office today.     Of note, the above-mentioned diagnoses/conditions and expected fluctuating nature of pain, and pain characteristic changes may lead to prolonged functional impairment requiring frequent and multiple reassessments with continued high level medical decision making. As noted, medication and medication management may require opiate therapy in excess of a routine less than 30 day medication requirement. The patient may require daily opiate therapy necessitating month-long prescription medication as noted above in order to perform activities of daily living and achieve acceptable quality of life with respect to their chronic pain condition for the foreseeable future. We monitor our patient's carefully through drug monitoring, medication counts, urine drug  testing specific to their medication as well as a myriad of other substances and with frequent follow-ups with interval reassement of the chronic pain condition, its pathophysiology and prognosis.     The level of clinical decision making at this office visit is high due to high risks and complications including mortality and morbidity related to acute and chronic pain with respects to life, bodily function, and treatment. Risks and clinical decisions with respect to under treatment, failure to maintain adequate treatment, and/or overtreatment complications and outcomes were discussed with the patient with respect to their chronic pain conditions, interventional therapies, as well as the use of various medications including possible controlled/dangerous medications. The amount and complexity of reviewed data at this in subsequent office visits is high given patient's fluctuating clinical presentation, laboratory and radiographic reports, prescription monitoring program data, and medication history as well as other relevant data as noted above. Pertinent negatives and positives data was used in consideration for the above-mentioned high complexity.      Given the patient's total MED, general use of daily opiates, or other coadministered medications in various classes the patient was offered a prescription for Narcan. I instructed the patient that it is important that patient fill this medication in order to demonstrate understanding of the gravity of possible side effects including respiratory depression and risk of overdose of this opiate load or medication combination. As such patient will be required to bring Narcan prescription to follow-up appointments as part of compliance with continued opiate care.     With respect to opiate induced constipation I discussed multiple ways to combat this problem including staying hydrated and taking over-the-counter medications such as Dulcolax, Miralax and Senna. If these  treatments are not effective we could consider such medications as Amitiza, Linzess and Movantik.     Disclaimer: This note was transcribed using an audio transcription device. As such, minor errors may be present with regard to spelling, punctuation, and inadvertent word insertion. Please disregard such errors.    Narrative & Impression   MRN: [id number]  Patient Name: MARIAN BURTON     STUDY:  SHOULDER, CMPLT, MIN 2 VIEWS;  11/4/2022 8:44 am     INDICATION:  right shoulder pain  M25.511: Right shoulder pain.     COMPARISON:  11/04/2022     ACCESSION NUMBER(S):  [id number]     ORDERING CLINICIAN:  PEDRO MCMAHON     FINDINGS:  Four views right shoulder.     Mild to moderate right glenohumeral osteoarthrosis and mild  acromioclavicular osteoarthrosis. No fracture or dislocation. No  osseous lesion. Soft tissues intact     IMPRESSION:  Mild to moderate right glenohumeral and acromioclavicular  osteoarthrosis. Fracture or dislocation.        Narrative & Impression   MRN: [id number]  Patient Name: MARIAN BURTON     STUDY:  MR L-SPINE WO/W CONTRAST; ;  10/6/2021 10:32 am     INDICATION:  lumbar region.     COMPARISON:  12/27/2018     ACCESSION NUMBER(S):  [id number]     ORDERING CLINICIAN:  CIPRIANO ALICEA     TECHNIQUE:  Multiplanar, multisequence imaging of the lumbar spine was performed  before and after intravenous contrast administration. 12 mL Dotarem  was injected.     FINDINGS:  Conus medullaris terminates at the level of L1 vertebral body.  Visualized portions of the conus and spinal cord shows normal signal  intensity and caliber. Grade 1 anterolisthesis of L5 on S1, measures  up to 8.9 mm, appears slightly decreased from previous study, was  previously measuring up to 10 mm. 2.9 mm retrolisthesis of T12 on L1  shows minimal interval change from prior.     Again note is made of the laminectomy changes at L5-S1 with a  intervening disc grafts at L4-L5, L5-S1 and a left-sided  transpedicular screws at L4, L5 and S1. Again  note is made of the  postsurgical changes with laminectomy/posterior clips at L2, causing  streak artifact and limiting evaluation.     On contrast administration there is no pathological enhancement.  Multiple areas of fatty rests in the thoracic and lumbar, sacral  vertebral bodies are again noted. Mild chronic decrease in the height  of T11 vertebral body/superior endplate is again noted.     At T11-T12 right paracentral disc bulge noted causing indentation of  the anterior thecal sac, minimally progressed from prior. No mass  effect on the cord.     At T12-L1 mild disc osteophyte noted indenting the anterior thecal  sac, mild facet joint arthropathy indenting the posterolateral aspect  of the thecal sac. No significant central spinal canal or neural  foramina stenosis.     At L1-L2 susceptibility artifact from the metallic clips of the L2  vertebral body limit evaluation, no obvious disc bulge, spinal canal  or neural foramina stenosis noted.     At L2-L3 mild facet joint arthropathy noted, postsurgical changes  with a metallic clip on the posterior elements cause susceptibility  artifact and limits evaluation, no obvious central spinal canal or  neural foramina stenosis noted.     At L3-L4 mild facet joint arthropathy noted. No central spinal canal  or neural foramina stenosis.     At L4-L5 mild disc osteophyte noted, moderate facet joint arthropathy  in combination noted causing mild encroachment of the spinal canal  and neural foramina, shows no significant change from prior.     At L5-S1 grade 1 anterolisthesis of L5 on S1, postsurgical changes as  described above, no central spinal canal or right neural foramina  narrowing, mild left neural foramina narrowing, shows minimal  interval change from prior.     IMPRESSION:  Degenerative and postoperative changes as described above, no  significant central spinal canal or neural foramina stenosis. No  significant change from prior.          Review of Systems    Constitutional: Negative.    HENT: Negative.     Eyes: Negative.    Respiratory: Negative.     Cardiovascular: Negative.    Gastrointestinal: Negative.    Endocrine: Negative.    Genitourinary: Negative.    Musculoskeletal:  Positive for arthralgias, back pain, gait problem and myalgias. Negative for joint swelling, neck pain and neck stiffness.   Skin: Negative.    Allergic/Immunologic: Negative.    Neurological:  Positive for weakness and numbness. Negative for dizziness, tremors, seizures, syncope, facial asymmetry, speech difficulty, light-headedness and headaches.   Hematological: Negative.    Psychiatric/Behavioral: Negative.         Objective   Physical Exam  Vitals and nursing note reviewed.   Constitutional:       Appearance: Normal appearance.   Eyes:      Conjunctiva/sclera: Conjunctivae normal.   Cardiovascular:      Pulses: Normal pulses.   Pulmonary:      Effort: Pulmonary effort is normal. No respiratory distress.   Musculoskeletal:      Right lower leg: No edema.      Left lower leg: No edema.      Comments: Posture stooped.  Ambulates with right-sided cane and antalgic gait.   Skin:     General: Skin is warm and dry.      Capillary Refill: Capillary refill takes 2 to 3 seconds.   Neurological:      Mental Status: She is alert and oriented to person, place, and time.      Cranial Nerves: No cranial nerve deficit.      Sensory: Sensory deficit present.      Motor: Weakness present.      Gait: Gait abnormal.      Comments: Right lower extremity with resisted hip flexion, knee extension and ankle dorsiflexion 3 out of 5 compared to left.      Right dropfoot with brace.    Allodynia to light touch to right and left lower extremities in L4 and L5 dermatomal distribution.  Negative straight leg raise.   Psychiatric:         Mood and Affect: Mood normal.         Behavior: Behavior normal.         Assessment/Plan   Problem List Items Addressed This Visit             ICD-10-CM    Lumbar radiculitis M54.16     Relevant Medications    traMADol (Ultram) 50 mg tablet (Start on 10/3/2024)    Lumbar post-laminectomy syndrome - Primary M96.1    Relevant Medications    traMADol (Ultram) 50 mg tablet (Start on 10/3/2024)     Other Visit Diagnoses         Codes    Encounter for long-term use of opiate analgesic     Z79.891    Relevant Medications    naloxone (Narcan) 4 mg/0.1 mL nasal spray             Follow-up 6 months.    Debby Tomas, EMMANUEL-CNP 10/02/24 1:04 PM

## 2024-11-06 ENCOUNTER — APPOINTMENT (OUTPATIENT)
Dept: PAIN MEDICINE | Facility: CLINIC | Age: 69
End: 2024-11-06
Payer: MEDICARE

## 2024-12-10 DIAGNOSIS — M54.16 LUMBAR RADICULOPATHY: ICD-10-CM

## 2024-12-10 DIAGNOSIS — M96.1 LUMBAR POST-LAMINECTOMY SYNDROME: ICD-10-CM

## 2024-12-10 DIAGNOSIS — M54.16 LUMBAR RADICULITIS: ICD-10-CM

## 2024-12-10 RX ORDER — GABAPENTIN 100 MG/1
100 CAPSULE ORAL 2 TIMES DAILY
Qty: 60 CAPSULE | Refills: 11 | Status: SHIPPED | OUTPATIENT
Start: 2024-12-10

## 2024-12-10 RX ORDER — TRAMADOL HYDROCHLORIDE 50 MG/1
50 TABLET ORAL 4 TIMES DAILY PRN
Qty: 112 TABLET | Refills: 2 | Status: SHIPPED | OUTPATIENT
Start: 2024-12-10

## 2025-02-04 ENCOUNTER — APPOINTMENT (OUTPATIENT)
Dept: RHEUMATOLOGY | Facility: CLINIC | Age: 70
End: 2025-02-04
Payer: MEDICARE

## 2025-02-04 VITALS
SYSTOLIC BLOOD PRESSURE: 132 MMHG | HEART RATE: 66 BPM | BODY MASS INDEX: 24.47 KG/M2 | DIASTOLIC BLOOD PRESSURE: 67 MMHG | WEIGHT: 133.8 LBS | OXYGEN SATURATION: 100 %

## 2025-02-04 DIAGNOSIS — M45.0 ANKYLOSING SPONDYLITIS OF MULTIPLE SITES IN SPINE (MULTI): Primary | ICD-10-CM

## 2025-02-04 DIAGNOSIS — Z79.899 ENCOUNTER FOR LONG-TERM (CURRENT) USE OF HIGH-RISK MEDICATION: ICD-10-CM

## 2025-02-04 PROCEDURE — 99213 OFFICE O/P EST LOW 20 MIN: CPT | Performed by: INTERNAL MEDICINE

## 2025-02-04 PROCEDURE — G2211 COMPLEX E/M VISIT ADD ON: HCPCS | Performed by: INTERNAL MEDICINE

## 2025-02-04 PROCEDURE — 1159F MED LIST DOCD IN RCRD: CPT | Performed by: INTERNAL MEDICINE

## 2025-02-04 PROCEDURE — 3078F DIAST BP <80 MM HG: CPT | Performed by: INTERNAL MEDICINE

## 2025-02-04 PROCEDURE — 3075F SYST BP GE 130 - 139MM HG: CPT | Performed by: INTERNAL MEDICINE

## 2025-02-04 PROCEDURE — 1036F TOBACCO NON-USER: CPT | Performed by: INTERNAL MEDICINE

## 2025-02-04 ASSESSMENT — PATIENT HEALTH QUESTIONNAIRE - PHQ9
1. LITTLE INTEREST OR PLEASURE IN DOING THINGS: NOT AT ALL
2. FEELING DOWN, DEPRESSED OR HOPELESS: NOT AT ALL
SUM OF ALL RESPONSES TO PHQ9 QUESTIONS 1 AND 2: 0

## 2025-02-04 ASSESSMENT — ENCOUNTER SYMPTOMS
DEPRESSION: 0
SHORTNESS OF BREATH: 0
NUMBNESS: 1
OCCASIONAL FEELINGS OF UNSTEADINESS: 1
LOSS OF SENSATION IN FEET: 0
FATIGUE: 1
SLEEP DISTURBANCE: 1

## 2025-02-04 NOTE — PROGRESS NOTES
"Subjective   Patient ID: Mariah Hurd is a 69 y.o. female who presents for Follow-up.  HPI  Patient with history of ankylosing spondylitis with history of fusion of the lumbar spine.  Previously has been on Enbrel, Humira, Cimzia.  Status post tuberculosis treatment.    Patient was last seen in August and at that time we had her continue with Cosentyx.    She says that her back is not \"horrible\".  She lives mostly at a 3/10 with most of her discomfort is in her upper back later on in the day.  She stopped going to the chiropractor and has been getting a massage at least once a month To help the left upper back.  She does her own strengthening and stretching on a regular basis.  She tries to use her stationary bike on most days.  She does use some free weights with her arms.  She did have a fall injuring her right knee which is a replacement but it now seems to have a little bit more range of motion.  She was walking out of the shower with her walker and stumbled and fell backward.    She may get occasional canker sores in her mouth but no other infections.  She does go see [facility] regularly.    Denies any GI issues.  Review of Systems   Constitutional:  Positive for fatigue.   HENT:          Canker sores occ   Eyes:         No inflammatory eye issues   Respiratory:  Negative for shortness of breath.    Cardiovascular:  Negative for chest pain.   Gastrointestinal:         Takes probiotic   Musculoskeletal:         Per HPI   Skin:  Negative for rash.   Neurological:  Positive for numbness.   Psychiatric/Behavioral:  Positive for sleep disturbance.        Objective   Physical Exam  GEN: NAD A&O x3 appropriate affect limping cane.   LYMPH: no cervical lymphadenopathy  SKIN: no rashes  PULSES: +radials  TENDER POINTS: 04/18   MSK:  hypertrophic changes in the dip pip  No tenderness on palpation  No tenderness in the cervical spine, tender lower spine  Right knee replacement      Assessment/Plan "     ankylosing spondylitis s/p fusion lumbar spine.  Previously has been on Enbrel, Humira, Cimzia.  Previous tuberculosis treatment.    -Will have her continue with Cosentyx   -will have her do blood work to monitor medication toxicity and disease activity.  She has tried modalities such as chiropractory.  Has been doing self-directed exercises and stretches and has been using a stationary bike.  Does see a massage therapist at least once a year    Last bone density from June 19, 2023 ordered by another provider showed osteopenia    Will see her again in 6 months or as needed       Kristy Lamb MD 02/04/25 9:17 AM

## 2025-02-06 LAB
ALBUMIN SERPL-MCNC: 4.2 G/DL (ref 3.6–5.1)
ALP SERPL-CCNC: 62 U/L (ref 37–153)
ALT SERPL-CCNC: 15 U/L (ref 6–29)
ANION GAP SERPL CALCULATED.4IONS-SCNC: 10 MMOL/L (CALC) (ref 7–17)
AST SERPL-CCNC: 18 U/L (ref 10–35)
BILIRUB SERPL-MCNC: 0.4 MG/DL (ref 0.2–1.2)
BUN SERPL-MCNC: 23 MG/DL (ref 7–25)
CALCIUM SERPL-MCNC: 9.6 MG/DL (ref 8.6–10.4)
CHLORIDE SERPL-SCNC: 104 MMOL/L (ref 98–110)
CO2 SERPL-SCNC: 25 MMOL/L (ref 20–32)
CREAT SERPL-MCNC: 0.92 MG/DL (ref 0.5–1.05)
CRP SERPL-MCNC: <3 MG/L
EGFRCR SERPLBLD CKD-EPI 2021: 67 ML/MIN/1.73M2
ERYTHROCYTE [DISTWIDTH] IN BLOOD BY AUTOMATED COUNT: 12.2 % (ref 11–15)
ERYTHROCYTE [SEDIMENTATION RATE] IN BLOOD BY WESTERGREN METHOD: 19 MM/H
GLUCOSE SERPL-MCNC: 82 MG/DL (ref 65–139)
HCT VFR BLD AUTO: 37.8 % (ref 35–45)
HGB BLD-MCNC: 12.2 G/DL (ref 11.7–15.5)
IGNF NEG CNTRL BLD: NORMAL
M TB IFN-G BLD-IMP: NEGATIVE
MCH RBC QN AUTO: 31.1 PG (ref 27–33)
MCHC RBC AUTO-ENTMCNC: 32.3 G/DL (ref 32–36)
MCV RBC AUTO: 96.4 FL (ref 80–100)
MITOGEN IGNF.SPOT COUNT BLD: NORMAL
PLATELET # BLD AUTO: 209 THOUSAND/UL (ref 140–400)
PMV BLD REES-ECKER: 10.2 FL (ref 7.5–12.5)
POTASSIUM SERPL-SCNC: 4.3 MMOL/L (ref 3.5–5.3)
PROT SERPL-MCNC: 7.2 G/DL (ref 6.1–8.1)
QUEST PANEL A SPOT COUNT: 0
QUEST PANEL B SPOT COUNT: 0
RBC # BLD AUTO: 3.92 MILLION/UL (ref 3.8–5.1)
SODIUM SERPL-SCNC: 139 MMOL/L (ref 135–146)
WBC # BLD AUTO: 6.8 THOUSAND/UL (ref 3.8–10.8)

## 2025-02-20 ENCOUNTER — APPOINTMENT (OUTPATIENT)
Dept: ORTHOPEDIC SURGERY | Facility: CLINIC | Age: 70
End: 2025-02-20
Payer: MEDICARE

## 2025-03-11 ENCOUNTER — APPOINTMENT (OUTPATIENT)
Dept: PAIN MEDICINE | Facility: CLINIC | Age: 70
End: 2025-03-11
Payer: MEDICARE

## 2025-03-11 VITALS
HEART RATE: 88 BPM | DIASTOLIC BLOOD PRESSURE: 65 MMHG | RESPIRATION RATE: 16 BRPM | SYSTOLIC BLOOD PRESSURE: 120 MMHG | OXYGEN SATURATION: 97 %

## 2025-03-11 DIAGNOSIS — M54.16 LUMBAR RADICULOPATHY: ICD-10-CM

## 2025-03-11 DIAGNOSIS — M96.1 LUMBAR POST-LAMINECTOMY SYNDROME: ICD-10-CM

## 2025-03-11 DIAGNOSIS — M54.16 LUMBAR RADICULITIS: ICD-10-CM

## 2025-03-11 PROCEDURE — 99203 OFFICE O/P NEW LOW 30 MIN: CPT | Performed by: ANESTHESIOLOGY

## 2025-03-11 PROCEDURE — G2211 COMPLEX E/M VISIT ADD ON: HCPCS | Performed by: ANESTHESIOLOGY

## 2025-03-11 PROCEDURE — 1159F MED LIST DOCD IN RCRD: CPT | Performed by: ANESTHESIOLOGY

## 2025-03-11 PROCEDURE — 3078F DIAST BP <80 MM HG: CPT | Performed by: ANESTHESIOLOGY

## 2025-03-11 PROCEDURE — 1125F AMNT PAIN NOTED PAIN PRSNT: CPT | Performed by: ANESTHESIOLOGY

## 2025-03-11 PROCEDURE — 3074F SYST BP LT 130 MM HG: CPT | Performed by: ANESTHESIOLOGY

## 2025-03-11 PROCEDURE — 99213 OFFICE O/P EST LOW 20 MIN: CPT | Performed by: ANESTHESIOLOGY

## 2025-03-11 RX ORDER — TRAMADOL HYDROCHLORIDE 50 MG/1
50 TABLET ORAL 4 TIMES DAILY PRN
Qty: 112 TABLET | Refills: 5 | Status: SHIPPED | OUTPATIENT
Start: 2025-03-11

## 2025-03-11 ASSESSMENT — PAIN SCALES - GENERAL: PAINLEVEL_OUTOF10: 4

## 2025-03-11 ASSESSMENT — PAIN DESCRIPTION - DESCRIPTORS: DESCRIPTORS: ACHING;DISCOMFORT

## 2025-03-11 ASSESSMENT — PAIN - FUNCTIONAL ASSESSMENT: PAIN_FUNCTIONAL_ASSESSMENT: 0-10
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
## 2025-03-12 ENCOUNTER — APPOINTMENT (OUTPATIENT)
Dept: PAIN MEDICINE | Facility: CLINIC | Age: 70
End: 2025-03-12
Payer: MEDICARE

## 2025-06-06 ENCOUNTER — PATIENT MESSAGE (OUTPATIENT)
Dept: OBSTETRICS AND GYNECOLOGY | Facility: CLINIC | Age: 70
End: 2025-06-06
Payer: MEDICARE

## 2025-06-06 DIAGNOSIS — Z12.31 VISIT FOR SCREENING MAMMOGRAM: Primary | ICD-10-CM

## 2025-06-17 ENCOUNTER — APPOINTMENT (OUTPATIENT)
Dept: OBSTETRICS AND GYNECOLOGY | Facility: CLINIC | Age: 70
End: 2025-06-17
Payer: MEDICARE

## 2025-06-17 VITALS — SYSTOLIC BLOOD PRESSURE: 122 MMHG | BODY MASS INDEX: 24.33 KG/M2 | WEIGHT: 133 LBS | DIASTOLIC BLOOD PRESSURE: 78 MMHG

## 2025-06-17 DIAGNOSIS — Z12.4 CERVICAL CANCER SCREENING: ICD-10-CM

## 2025-06-17 DIAGNOSIS — Z12.31 VISIT FOR SCREENING MAMMOGRAM: ICD-10-CM

## 2025-06-17 DIAGNOSIS — Z78.0 MENOPAUSE: ICD-10-CM

## 2025-06-17 DIAGNOSIS — Z01.419 WELL WOMAN EXAM: Primary | ICD-10-CM

## 2025-06-17 PROBLEM — Z86.39 HISTORY OF HYPERCHOLESTEROLEMIA: Status: ACTIVE | Noted: 2025-06-17

## 2025-06-17 PROBLEM — A15.9 TUBERCULOSIS: Status: RESOLVED | Noted: 2025-06-17 | Resolved: 2025-06-17

## 2025-06-17 PROBLEM — S46.209A INJURY OF TENDON OF BICEPS: Status: RESOLVED | Noted: 2025-06-17 | Resolved: 2025-06-17

## 2025-06-17 PROBLEM — B01.9 VARICELLA: Status: RESOLVED | Noted: 2025-06-17 | Resolved: 2025-06-17

## 2025-06-17 PROBLEM — R26.9 GAIT ABNORMALITY: Status: RESOLVED | Noted: 2024-05-30 | Resolved: 2025-06-17

## 2025-06-17 PROBLEM — I25.10 ATHEROSCLEROSIS OF CORONARY ARTERY: Status: ACTIVE | Noted: 2024-05-30

## 2025-06-17 PROBLEM — M17.10 ARTHRITIS OF KNEE: Status: ACTIVE | Noted: 2018-08-27

## 2025-06-17 PROBLEM — Z86.79 HISTORY OF HYPERTENSION: Status: ACTIVE | Noted: 2025-06-17

## 2025-06-17 PROBLEM — M96.1 POSTLAMINECTOMY SYNDROME OF LUMBAR REGION: Status: ACTIVE | Noted: 2020-06-23

## 2025-06-17 PROBLEM — C44.601: Status: RESOLVED | Noted: 2025-06-17 | Resolved: 2025-06-17

## 2025-06-17 PROCEDURE — 3078F DIAST BP <80 MM HG: CPT | Performed by: OBSTETRICS & GYNECOLOGY

## 2025-06-17 PROCEDURE — 99397 PER PM REEVAL EST PAT 65+ YR: CPT | Performed by: OBSTETRICS & GYNECOLOGY

## 2025-06-17 PROCEDURE — 1159F MED LIST DOCD IN RCRD: CPT | Performed by: OBSTETRICS & GYNECOLOGY

## 2025-06-17 PROCEDURE — 1036F TOBACCO NON-USER: CPT | Performed by: OBSTETRICS & GYNECOLOGY

## 2025-06-17 PROCEDURE — 3074F SYST BP LT 130 MM HG: CPT | Performed by: OBSTETRICS & GYNECOLOGY

## 2025-06-17 ASSESSMENT — ENCOUNTER SYMPTOMS
NEUROLOGICAL NEGATIVE: 1
CONSTITUTIONAL NEGATIVE: 1
CARDIOVASCULAR NEGATIVE: 1
RESPIRATORY NEGATIVE: 1
GASTROINTESTINAL NEGATIVE: 1
MUSCULOSKELETAL NEGATIVE: 1
PSYCHIATRIC NEGATIVE: 1

## 2025-06-17 NOTE — PATIENT INSTRUCTIONS
Routine Gynecologic Visit:  You were seen today for a routine gyn visit with normal findings on exam  Your pap smear had normal cells and was negative for HPV prior to age 65, so you were not due for a pap smear today. You should still continue to get annual breast and pelvic exams in the office.  An order was placed in the system for mammogram and DEXA bone scan. Please get done at your earliest convenience  If you are having any gynecologic issues in the next year you should call the office. [phone] ([place]) or [phone] ([place])
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
## 2025-06-23 ENCOUNTER — APPOINTMENT (OUTPATIENT)
Dept: RADIOLOGY | Facility: HOSPITAL | Age: 70
End: 2025-06-23
Payer: MEDICARE

## 2025-06-23 ENCOUNTER — HOSPITAL ENCOUNTER (OUTPATIENT)
Dept: RADIOLOGY | Facility: HOSPITAL | Age: 70
Discharge: HOME | End: 2025-06-23
Payer: MEDICARE

## 2025-06-23 DIAGNOSIS — Z12.31 VISIT FOR SCREENING MAMMOGRAM: ICD-10-CM

## 2025-06-23 DIAGNOSIS — Z78.0 MENOPAUSE: ICD-10-CM

## 2025-06-23 PROCEDURE — 77063 BREAST TOMOSYNTHESIS BI: CPT | Performed by: RADIOLOGY

## 2025-06-23 PROCEDURE — 77080 DXA BONE DENSITY AXIAL: CPT | Performed by: RADIOLOGY

## 2025-06-23 PROCEDURE — 77067 SCR MAMMO BI INCL CAD: CPT

## 2025-06-23 PROCEDURE — 77067 SCR MAMMO BI INCL CAD: CPT | Performed by: RADIOLOGY

## 2025-06-23 PROCEDURE — 77080 DXA BONE DENSITY AXIAL: CPT

## 2025-08-05 ENCOUNTER — APPOINTMENT (OUTPATIENT)
Dept: RHEUMATOLOGY | Facility: CLINIC | Age: 70
End: 2025-08-05
Payer: MEDICARE

## 2025-08-05 ENCOUNTER — SPECIALTY PHARMACY (OUTPATIENT)
Dept: PHARMACY | Facility: CLINIC | Age: 70
End: 2025-08-05

## 2025-08-05 VITALS
DIASTOLIC BLOOD PRESSURE: 65 MMHG | WEIGHT: 131.4 LBS | BODY MASS INDEX: 24.18 KG/M2 | OXYGEN SATURATION: 99 % | HEIGHT: 62 IN | HEART RATE: 72 BPM | SYSTOLIC BLOOD PRESSURE: 127 MMHG

## 2025-08-05 DIAGNOSIS — M45.0 ANKYLOSING SPONDYLITIS OF MULTIPLE SITES IN SPINE (MULTI): Primary | ICD-10-CM

## 2025-08-05 DIAGNOSIS — Z79.899 ENCOUNTER FOR LONG-TERM (CURRENT) USE OF HIGH-RISK MEDICATION: ICD-10-CM

## 2025-08-05 DIAGNOSIS — R26.89 BALANCE PROBLEM: ICD-10-CM

## 2025-08-05 DIAGNOSIS — M85.89 OSTEOPENIA OF MULTIPLE SITES: ICD-10-CM

## 2025-08-05 PROCEDURE — 3008F BODY MASS INDEX DOCD: CPT | Performed by: INTERNAL MEDICINE

## 2025-08-05 PROCEDURE — 99213 OFFICE O/P EST LOW 20 MIN: CPT | Performed by: INTERNAL MEDICINE

## 2025-08-05 PROCEDURE — 3078F DIAST BP <80 MM HG: CPT | Performed by: INTERNAL MEDICINE

## 2025-08-05 PROCEDURE — 1159F MED LIST DOCD IN RCRD: CPT | Performed by: INTERNAL MEDICINE

## 2025-08-05 PROCEDURE — 3074F SYST BP LT 130 MM HG: CPT | Performed by: INTERNAL MEDICINE

## 2025-08-05 PROCEDURE — G2211 COMPLEX E/M VISIT ADD ON: HCPCS | Performed by: INTERNAL MEDICINE

## 2025-08-05 PROCEDURE — 1125F AMNT PAIN NOTED PAIN PRSNT: CPT | Performed by: INTERNAL MEDICINE

## 2025-08-05 RX ORDER — SECUKINUMAB 150 MG/ML
150 INJECTION SUBCUTANEOUS
Qty: 3 ML | Refills: 3 | Status: SHIPPED | OUTPATIENT
Start: 2025-08-05 | End: 2026-08-05

## 2025-08-05 ASSESSMENT — ENCOUNTER SYMPTOMS
NUMBNESS: 1
FATIGUE: 1
SLEEP DISTURBANCE: 1
SHORTNESS OF BREATH: 0

## 2025-08-05 ASSESSMENT — PAIN SCALES - GENERAL: PAINLEVEL_OUTOF10: 4

## 2025-08-05 NOTE — PROGRESS NOTES
Subjective   Patient ID: Mariah Hurd is a 70 y.o. female who presents for Follow-up (6 month ).  HPI  Patient with history of ankylosing spondylitis with history of fusion of the lumbar spine.  Previously has been on Enbrel, Humira, Cimzia.  Status post tuberculosis treatment.  Patient was last seen in February.  She continues to be on Cosentyx.  She feels severe after year her balance is getting slightly worse.  When she does some balance exercises it may aggravate her back.  She could be better doing other balance exercises.  She realizes her  does a lot for her.  She is very cognizant about how she is viewed.  She does not like going out because she is afraid she is going to fall.  She did have a near fall in the house when her cane slipped on water and she caught herself.  She thinks she may have torn something in her left upper extremity as there was some bruising afterward  Nothing else new with her health  She does get canker sores regularly but not too often.  Denies any bowel issues  She does continue with pain management and defers any further interventional treatment as it has not really helped.  She does have chronic pain in her back and it gets worse as the day progresses    She does feel that the Cosentyx helps with her symptoms  Review of Systems   Constitutional:  Positive for fatigue.   HENT:          Canker sores occ   Eyes:         No inflammatory eye issues   Respiratory:  Negative for shortness of breath.    Cardiovascular:  Negative for chest pain.   Gastrointestinal:         Takes probiotic   Musculoskeletal:         Per HPI   Skin:  Negative for rash.   Neurological:  Positive for numbness.   Psychiatric/Behavioral:  Positive for sleep disturbance.        Objective   Physical Exam  GEN: NAD A&O x3 appropriate affect limping cane.   LYMPH: no cervical lymphadenopathy  SKIN: no rashes  PULSES: +radials  TENDER POINTS: 04/18   MSK:  hypertrophic changes in the dip pip  No tenderness  on palpation  No tenderness in the cervical spine, tender lower spine  Right knee replacement      Assessment/Plan     ankylosing spondylitis s/p fusion lumbar spine.  Previously has been on Enbrel, Humira, Cimzia.  Previous tuberculosis treatment.    -Will have her continue with Cosentyx and currently stable.  Does help greater than 20%.  -will have her do blood work to monitor medication toxicity and disease activity.    - Encouraged her to try to do her balance exercises more regularly    Reviewed her bone density and has had mild decrease but still osteopenic  DEXA 6/23/2025 left total hip -1.8, left femoral neck -1.8, left total forearm -1.7, UD -1.4, mid -1.9, 1/3-1.7   DEXA 6/19/2023 left total hip -1.5, left femoral neck -1.2, left total forearm -1.3, UD -1.1, mid, -1.5, 1/3-1.5    Will see her again in 6 months or as needed       Kristy Lamb MD 08/05/25 10:10 AM

## 2025-08-06 LAB
ALBUMIN SERPL-MCNC: 4.4 G/DL (ref 3.6–5.1)
ALP SERPL-CCNC: 62 U/L (ref 37–153)
ALT SERPL-CCNC: 15 U/L (ref 6–29)
ANION GAP SERPL CALCULATED.4IONS-SCNC: 9 MMOL/L (CALC) (ref 7–17)
AST SERPL-CCNC: 19 U/L (ref 10–35)
BILIRUB SERPL-MCNC: 0.5 MG/DL (ref 0.2–1.2)
BUN SERPL-MCNC: 24 MG/DL (ref 7–25)
CALCIUM SERPL-MCNC: 10 MG/DL (ref 8.6–10.4)
CHLORIDE SERPL-SCNC: 102 MMOL/L (ref 98–110)
CO2 SERPL-SCNC: 26 MMOL/L (ref 20–32)
CREAT SERPL-MCNC: 1.01 MG/DL (ref 0.6–1)
CRP SERPL-MCNC: <3 MG/L
EGFRCR SERPLBLD CKD-EPI 2021: 60 ML/MIN/1.73M2
ERYTHROCYTE [DISTWIDTH] IN BLOOD BY AUTOMATED COUNT: 12.8 % (ref 11–15)
ERYTHROCYTE [SEDIMENTATION RATE] IN BLOOD BY WESTERGREN METHOD: 14 MM/H
GLUCOSE SERPL-MCNC: 89 MG/DL (ref 65–99)
HCT VFR BLD AUTO: 37.1 % (ref 35–45)
HGB BLD-MCNC: 12.1 G/DL (ref 11.7–15.5)
MCH RBC QN AUTO: 31.3 PG (ref 27–33)
MCHC RBC AUTO-ENTMCNC: 32.6 G/DL (ref 32–36)
MCV RBC AUTO: 95.9 FL (ref 80–100)
PLATELET # BLD AUTO: 185 THOUSAND/UL (ref 140–400)
PMV BLD REES-ECKER: 10.3 FL (ref 7.5–12.5)
POTASSIUM SERPL-SCNC: 4.7 MMOL/L (ref 3.5–5.3)
PROT SERPL-MCNC: 7.4 G/DL (ref 6.1–8.1)
RBC # BLD AUTO: 3.87 MILLION/UL (ref 3.8–5.1)
SODIUM SERPL-SCNC: 137 MMOL/L (ref 135–146)
WBC # BLD AUTO: 6.3 THOUSAND/UL (ref 3.8–10.8)

## 2025-09-08 ENCOUNTER — APPOINTMENT (OUTPATIENT)
Dept: PAIN MEDICINE | Facility: CLINIC | Age: 70
End: 2025-09-08
Payer: MEDICARE

## 2026-02-03 ENCOUNTER — APPOINTMENT (OUTPATIENT)
Dept: RHEUMATOLOGY | Facility: CLINIC | Age: 71
End: 2026-02-03
Payer: MEDICARE